# Patient Record
Sex: MALE | Race: ASIAN | NOT HISPANIC OR LATINO
[De-identification: names, ages, dates, MRNs, and addresses within clinical notes are randomized per-mention and may not be internally consistent; named-entity substitution may affect disease eponyms.]

---

## 2020-09-30 ENCOUNTER — APPOINTMENT (OUTPATIENT)
Dept: RADIOLOGY | Facility: CLINIC | Age: 50
End: 2020-09-30

## 2022-03-15 ENCOUNTER — NON-APPOINTMENT (OUTPATIENT)
Age: 52
End: 2022-03-15

## 2022-03-18 ENCOUNTER — APPOINTMENT (OUTPATIENT)
Dept: NEPHROLOGY | Facility: CLINIC | Age: 52
End: 2022-03-18
Payer: COMMERCIAL

## 2022-03-18 VITALS
OXYGEN SATURATION: 98 % | SYSTOLIC BLOOD PRESSURE: 141 MMHG | HEIGHT: 64 IN | WEIGHT: 155 LBS | HEART RATE: 103 BPM | BODY MASS INDEX: 26.46 KG/M2 | DIASTOLIC BLOOD PRESSURE: 88 MMHG

## 2022-03-18 DIAGNOSIS — Z00.00 ENCOUNTER FOR GENERAL ADULT MEDICAL EXAMINATION W/OUT ABNORMAL FINDINGS: ICD-10-CM

## 2022-03-18 DIAGNOSIS — E83.52 HYPERCALCEMIA: ICD-10-CM

## 2022-03-18 PROCEDURE — 99205 OFFICE O/P NEW HI 60 MIN: CPT

## 2022-03-18 RX ORDER — SITAGLIPTIN AND METFORMIN HYDROCHLORIDE 50; 1000 MG/1; MG/1
50-1000 TABLET, FILM COATED ORAL DAILY
Refills: 0 | Status: ACTIVE | COMMUNITY
Start: 2022-03-18

## 2022-03-18 RX ORDER — MELATONIN 3 MG
TABLET ORAL
Refills: 0 | Status: ACTIVE | COMMUNITY

## 2022-03-18 RX ORDER — ASPIRIN 81 MG
81 TABLET, DELAYED RELEASE (ENTERIC COATED) ORAL
Refills: 0 | Status: ACTIVE | COMMUNITY

## 2022-03-18 NOTE — PHYSICAL EXAM
[General Appearance - Alert] : alert [General Appearance - In No Acute Distress] : in no acute distress [General Appearance - Well Nourished] : well nourished [General Appearance - Well-Appearing] : healthy appearing [Sclera] : the sclera and conjunctiva were normal [PERRL With Normal Accommodation] : pupils were equal in size, round, and reactive to light [Extraocular Movements] : extraocular movements were intact [Outer Ear] : the ears and nose were normal in appearance [Hearing Threshold Finger Rub Not Beadle] : hearing was normal [Neck Appearance] : the appearance of the neck was normal [Neck Cervical Mass (___cm)] : no neck mass was observed [Jugular Venous Distention Increased] : there was no jugular-venous distention [Thyroid Diffuse Enlargement] : the thyroid was not enlarged [Respiration, Rhythm And Depth] : normal respiratory rhythm and effort [Exaggerated Use Of Accessory Muscles For Inspiration] : no accessory muscle use [Auscultation Breath Sounds / Voice Sounds] : lungs were clear to auscultation bilaterally [Apical Impulse] : the apical impulse was normal [Heart Rate And Rhythm] : heart rate was normal and rhythm regular [Heart Sounds] : normal S1 and S2 [Murmurs] : no murmurs [Arterial Pulses Carotid] : carotid pulses were normal with no bruits [Edema] : there was no peripheral edema [Bowel Sounds] : normal bowel sounds [Abdomen Soft] : soft [Abdomen Tenderness] : non-tender [No CVA Tenderness] : no ~M costovertebral angle tenderness [Abnormal Walk] : normal gait [Nail Clubbing] : no clubbing  or cyanosis of the fingernails [Involuntary Movements] : no involuntary movements were seen [Musculoskeletal - Swelling] : no joint swelling seen [Skin Color & Pigmentation] : normal skin color and pigmentation [Skin Turgor] : normal skin turgor [] : no rash [Skin Lesions] : no skin lesions [Cranial Nerves] : cranial nerves 2-12 were intact [Motor Exam] : the motor exam was normal [No Focal Deficits] : no focal deficits [Oriented To Time, Place, And Person] : oriented to person, place, and time [Impaired Insight] : insight and judgment were intact [Affect] : the affect was normal [Mood] : the mood was normal [Memory Recent] : recent memory was not impaired

## 2022-03-21 ENCOUNTER — LABORATORY RESULT (OUTPATIENT)
Age: 52
End: 2022-03-21

## 2022-03-22 ENCOUNTER — NON-APPOINTMENT (OUTPATIENT)
Age: 52
End: 2022-03-22

## 2022-03-22 LAB
24R-OH-CALCIDIOL SERPL-MCNC: 50.5 PG/ML
APPEARANCE: CLEAR
BACTERIA: NEGATIVE
BILIRUBIN URINE: NEGATIVE
BLOOD URINE: NEGATIVE
CALCIUM ?TM UR-MCNC: 1.9 MG/DL
CALCIUM SERPL-MCNC: 9.9 MG/DL
COLOR: NORMAL
CREAT SPEC-SCNC: 59 MG/DL
GLUCOSE QUALITATIVE U: NORMAL
HAV IGM SER QL: NONREACTIVE
HBV CORE IGM SER QL: NONREACTIVE
HBV SURFACE AG SER QL: NONREACTIVE
HCV AB SER QL: NONREACTIVE
HCV S/CO RATIO: 0.13 S/CO
HIV1+2 AB SPEC QL IA.RAPID: NONREACTIVE
HYALINE CASTS: 0 /LPF
KETONES URINE: NEGATIVE
LEUKOCYTE ESTERASE URINE: NEGATIVE
MAGNESIUM SERPL-MCNC: 1.8 MG/DL
MICROALBUMIN 24H UR DL<=1MG/L-MCNC: 74.7 MG/DL
MICROALBUMIN/CREAT 24H UR-RTO: 1269 MG/G
MICROSCOPIC-UA: NORMAL
NITRITE URINE: NEGATIVE
PARATHYROID HORMONE INTACT: 47 PG/ML
PH URINE: 6
POTASSIUM UR-SCNC: 25.5 MMOL/L
PROT UR-MCNC: 123 MG/DL
PROTEIN URINE: ABNORMAL
RED BLOOD CELLS URINE: 2 /HPF
SPECIFIC GRAVITY URINE: 1.01
SQUAMOUS EPITHELIAL CELLS: 0 /HPF
UROBILINOGEN URINE: NORMAL
WHITE BLOOD CELLS URINE: 1 /HPF

## 2022-03-29 ENCOUNTER — APPOINTMENT (OUTPATIENT)
Dept: NEPHROLOGY | Facility: CLINIC | Age: 52
End: 2022-03-29
Payer: COMMERCIAL

## 2022-03-29 LAB
25(OH)D3 SERPL-MCNC: 22.1 NG/ML
ALBUMIN MFR SERPL ELPH: 57.7 %
ALBUMIN SERPL ELPH-MCNC: 4.6 G/DL
ALBUMIN SERPL-MCNC: 4.3 G/DL
ALBUMIN/GLOB SERPL: 1.4 RATIO
ALBUPE: 64.7 %
ALPHA1 GLOB MFR SERPL ELPH: 3.6 %
ALPHA1 GLOB SERPL ELPH-MCNC: 0.3 G/DL
ALPHA1UPE: 11.6 %
ALPHA2 GLOB MFR SERPL ELPH: 10.7 %
ALPHA2 GLOB SERPL ELPH-MCNC: 0.8 G/DL
ALPHA2UPE: 7 %
ANA SER IF-ACNC: NEGATIVE
ANION GAP SERPL CALC-SCNC: 16 MMOL/L
B-GLOBULIN MFR SERPL ELPH: 11.7 %
B-GLOBULIN SERPL ELPH-MCNC: 0.9 G/DL
BETAUPE: 7.4 %
BUN SERPL-MCNC: 26 MG/DL
C3 SERPL-MCNC: 111 MG/DL
C4 SERPL-MCNC: 38 MG/DL
CALCIUM SERPL-MCNC: 9.9 MG/DL
CHLORIDE SERPL-SCNC: 101 MMOL/L
CO2 SERPL-SCNC: 20 MMOL/L
CREAT 24H UR-MCNC: NORMAL G/24 H
CREAT SERPL-MCNC: 1.8 MG/DL
CREATININE UR (MAYO): 58 MG/DL
DEPRECATED KAPPA LC FREE/LAMBDA SER: 0.78 RATIO
DSDNA AB SER-ACNC: 12 IU/ML
EGFR: 45 ML/MIN/1.73M2
GAMMA GLOB FLD ELPH-MCNC: 1.2 G/DL
GAMMA GLOB MFR SERPL ELPH: 16.3 %
GAMMAUPE: 9.3 %
GBM AB TITR SER IF: 3
GLUCOSE SERPL-MCNC: 222 MG/DL
IGA 24H UR QL IFE: NORMAL
IGA SER QL IEP: 322 MG/DL
IGG SER QL IEP: 1202 MG/DL
IGM SER QL IEP: 78 MG/DL
INTERPRETATION SERPL IEP-IMP: NORMAL
KAPPA LC 24H UR QL: NORMAL
KAPPA LC CSF-MCNC: 5.5 MG/DL
KAPPA LC SERPL-MCNC: 4.31 MG/DL
M PROTEIN SPEC IFE-MCNC: NORMAL
PHOSPHATE SERPL-MCNC: 3.4 MG/DL
PHOSPHOLIPASE A2 RECEPTOR ELISA: <1.8 RU/ML
POTASSIUM SERPL-SCNC: 5.1 MMOL/L
PROT PATTERN 24H UR ELPH-IMP: NORMAL
PROT SERPL-MCNC: 7.4 G/DL
PROT SERPL-MCNC: 7.4 G/DL
PROT UR-MCNC: 120 MG/DL
PROT UR-MCNC: 120 MG/DL
SODIUM SERPL-SCNC: 137 MMOL/L
SPECIMEN VOL 24H UR: NORMAL ML

## 2022-03-29 PROCEDURE — 99443: CPT

## 2022-03-29 NOTE — HISTORY OF PRESENT ILLNESS
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 Patient is taking\par - Counseled regarding need to control DM\par - Discussed possible KBx. All q's and c's addressed

## 2022-03-29 NOTE — ASSESSMENT
[FreeTextEntry1] : RIVERA FUNG is being seen for CKD and Hypercalcemia\par \par ---------------------------------\par # CKD 3A\par - BASELINE CR: <1.5 - 1.7> based on available labs\par - PROTEINURIA: \par --- RISK FACTORS:\par 1) DM- Pt has DMR\par 2) Ischemic nephropathy 2/2 HTN\par 3) Smoker's GN or other GN\par --- PLAN:\par 1) Renal panel\par 2) U/a, UPC, ACR\par 3) Proteinuria serologies\par 4) MEDS:\par > Will start Farxiga 5mg\par > Hold MRA as K high (5.3)\par \par # HYPERCALCEMIA\par  - 10.5- 10.9\par --- PLAN:\par 1) PTH\par 2) Will go ahead and order Vit D levels and FeCa\par \par # HTN:\par - Current meds:\par 1) Lisinopril 20mg\par --- PLAN:\par 1) Check BP at home and record\par 2) Continue Lisinopril\par 3) Repeat K levels\par \par # HYPERKALEMIA\par - Likely RTA due to DM\par --- PLAN:\par 1) Repeat K\par 2) Mg level\par 3) If still elevated, will start Lokelma\par \par ----------------------------------------------------------\par PATIENT TO GET LABS ON MON 3/21 2:30PM\par FF UP 3 MONTHS

## 2022-03-29 NOTE — ASSESSMENT
[FreeTextEntry1] : RIVERA FUNG is being seen for CKD and Hypercalcemia\par \par ---------------------------------\par # CKD 3A\par - BASELINE CR: <1.5 - 1.7> based on available labs\par - TREND: 1.8 (3/21/22)\par - PROTEINURIA: -- ACR 1.2/ UPC 2.08 (3/21/22)\par - SEROLOGIES: All WNL\par --- RISK FACTORS:\par 1) DM- Pt has DMR\par 2) Ischemic nephropathy 2/2 HTN\par 3) Smoker's GN or other GN\par --- PLAN:\par 1) Needs tighter DM control. Counseled. \par 2) Plan is for better DM control then ff up in 1 month. If Cr and UPC trending up, will consider Kbx. D/w patient\par 3) MEDS:\par > Farxiga 5mg approved. Patient taking\par > Will increase Lisinopril to 40 +/- add MRA next visit if K holds\par \par # HYPERCALCEMIA\par  - 10.5- 10.9- 9.9\par --- PLAN:\par 1) PTH 47, Ca now 9.9\par 2) Vit D 1.25 WNL. \par \par # HTN:\par - Current meds:\par 1) Lisinopril 20mg\par --- PLAN:\par 1) Check BP at home and record\par 2) Will increase Lisinopril to 40 +/- add MRA next visit if K holds\par \par # HYPERKALEMIA\par - Likely RTA due to DM\par --- PLAN:\par 1) K 5.1. on higher side. Likely RTA\par \par ----------------------------------------------------------\par FF UP IN 1 MONTH. LABS BEFOREHAND\par

## 2022-03-29 NOTE — HISTORY OF PRESENT ILLNESS
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

## 2022-03-29 NOTE — REASON FOR VISIT
[Home] : at home, [unfilled] , at the time of the visit. [Medical Office: ([facility], [place])___] : at the medical office located in  [Follow-Up] : a follow-up visit

## 2022-04-21 ENCOUNTER — APPOINTMENT (OUTPATIENT)
Dept: RADIOLOGY | Facility: IMAGING CENTER | Age: 52
End: 2022-04-21
Payer: COMMERCIAL

## 2022-04-21 ENCOUNTER — OUTPATIENT (OUTPATIENT)
Dept: OUTPATIENT SERVICES | Facility: HOSPITAL | Age: 52
LOS: 1 days | End: 2022-04-21
Payer: COMMERCIAL

## 2022-04-21 DIAGNOSIS — Z00.8 ENCOUNTER FOR OTHER GENERAL EXAMINATION: ICD-10-CM

## 2022-04-21 PROCEDURE — 71046 X-RAY EXAM CHEST 2 VIEWS: CPT

## 2022-04-21 PROCEDURE — 72050 X-RAY EXAM NECK SPINE 4/5VWS: CPT | Mod: 26

## 2022-04-21 PROCEDURE — 72050 X-RAY EXAM NECK SPINE 4/5VWS: CPT

## 2022-04-21 PROCEDURE — 71046 X-RAY EXAM CHEST 2 VIEWS: CPT | Mod: 26

## 2022-05-27 ENCOUNTER — APPOINTMENT (OUTPATIENT)
Dept: CARDIOLOGY | Facility: CLINIC | Age: 52
End: 2022-05-27

## 2022-06-03 ENCOUNTER — NON-APPOINTMENT (OUTPATIENT)
Age: 52
End: 2022-06-03

## 2022-06-03 ENCOUNTER — APPOINTMENT (OUTPATIENT)
Dept: NEPHROLOGY | Facility: CLINIC | Age: 52
End: 2022-06-03

## 2022-06-03 VITALS
HEIGHT: 64 IN | OXYGEN SATURATION: 99 % | HEART RATE: 84 BPM | DIASTOLIC BLOOD PRESSURE: 73 MMHG | TEMPERATURE: 97.8 F | WEIGHT: 151 LBS | BODY MASS INDEX: 25.78 KG/M2 | SYSTOLIC BLOOD PRESSURE: 120 MMHG

## 2022-06-03 DIAGNOSIS — E87.5 HYPERKALEMIA: ICD-10-CM

## 2022-06-03 PROCEDURE — 99213 OFFICE O/P EST LOW 20 MIN: CPT

## 2022-06-03 NOTE — PHYSICAL EXAM
[General Appearance - Alert] : alert [General Appearance - In No Acute Distress] : in no acute distress [General Appearance - Well Nourished] : well nourished [General Appearance - Well-Appearing] : healthy appearing [Sclera] : the sclera and conjunctiva were normal [PERRL With Normal Accommodation] : pupils were equal in size, round, and reactive to light [Extraocular Movements] : extraocular movements were intact [Outer Ear] : the ears and nose were normal in appearance [Hearing Threshold Finger Rub Not [place]] : hearing was normal [Neck Appearance] : the appearance of the neck was normal [Neck Cervical Mass (___cm)] : no neck mass was observed [Jugular Venous Distention Increased] : there was no jugular-venous distention [Thyroid Diffuse Enlargement] : the thyroid was not enlarged [Respiration, Rhythm And Depth] : normal respiratory rhythm and effort [Exaggerated Use Of Accessory Muscles For Inspiration] : no accessory muscle use [Auscultation Breath Sounds / Voice Sounds] : lungs were clear to auscultation bilaterally [Apical Impulse] : the apical impulse was normal [Heart Rate And Rhythm] : heart rate was normal and rhythm regular [Heart Sounds] : normal S1 and S2 [Murmurs] : no murmurs [Arterial Pulses Carotid] : carotid pulses were normal with no bruits [Edema] : there was no peripheral edema [Bowel Sounds] : normal bowel sounds [Abdomen Soft] : soft [Abdomen Tenderness] : non-tender [No CVA Tenderness] : no ~M costovertebral angle tenderness [Abnormal Walk] : normal gait [Nail Clubbing] : no clubbing  or cyanosis of the fingernails [Involuntary Movements] : no involuntary movements were seen [Musculoskeletal - Swelling] : no joint swelling seen [Skin Color & Pigmentation] : normal skin color and pigmentation [Skin Turgor] : normal skin turgor [] : no rash [Skin Lesions] : no skin lesions [Cranial Nerves] : cranial nerves 2-12 were intact [Motor Exam] : the motor exam was normal [No Focal Deficits] : no focal deficits [Oriented To Time, Place, And Person] : oriented to person, place, and time [Impaired Insight] : insight and judgment were intact [Affect] : the affect was normal [Mood] : the mood was normal [Memory Recent] : recent memory was not impaired

## 2022-06-03 NOTE — HISTORY OF PRESENT ILLNESS
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 Patient is taking\par - Counseled regarding need to control DM\par - Discussed possible KBx. All q's and c's addressed\par \par --------------------------------------------------\par 6/3/22\par - HOSPITALIZATION: Pt hospitalized May 2020 for diarrhea x 5 days. Found to have symptomatic anemia + OMAR(?). Had also been drinking Gatorade to hydrate himself so his K was reportedly 7.0+. Discharge Cr was reportedly 1.6 but it was higher during his admission. Admitted to [facility].\par - ANEMIA: Was told bleeding was "external" to GI tract. For colonoscopy next month. Also following-up with Hematology. Had IV iron transfusion, finished yesterday. \par - TOBACCO USE: Quit 2 months ago\par - DM: Met with nutritionist last week from [facility] so she can better relate to his diet\par - MED REVIEW: Stopped Lisinopril (OMAR and hyperK). Taking Farxiga. \par

## 2022-06-03 NOTE — ASSESSMENT
[FreeTextEntry1] : RIVERA FUNG is being seen for CKD and Hypercalcemia\par \par ---------------------------------\par # CKD 3A\par - BASELINE CR: <1.5 - 1.7> based on available labs\par - TREND: 1.8 (3/21/22)-- Hospitalized May 2020 for diarrhea and OMAR -- d/c Cr 1.6\par - PROTEINURIA: -- ACR 1.2/ UPC 2.08 (3/21/22)\par - SEROLOGIES: All WNL\par --- RISK FACTORS:\par 1) DM- Pt has DMR\par 2) Ischemic nephropathy 2/2 HTN\par 3) Smoker's GN or other GN\par --- PLAN:\par 1) OMAR in hospital, but he could not remember what he was told the etiology was for anemia. D/c Cr reportedly 1.6. So it sounds hemodynamically-driven due to anemia if he came in with OMAR which resolved with transfusions\par 2) Repeat renal panel today. \par 3) PROTEINURIA: Repeat UPC to trend\par > Lisinopril held 2/2 OMAR and hyperK. Recheck K today\par > Taking Farxiga\par > MRA: Hold off until K stable\par 4) Rest of meds reviewed\par \par # HYPERCALCEMIA\par  - 10.5- 10.9- 9.9\par --- PLAN:\par 1) PTH 47, Ca 9.9 last labs\par 2) Repeat Ca today\par \par # HTN:\par - Current meds:\par 1) Lisinopril 20mg- HELD\par --- PLAN:\par 1) BP acceptable w/o meds\par 2) Will restart ACE/ARB low dose if K holds\par \par # HYPERKALEMIA\par - Likely RTA due to DM\par --- PLAN:\par 1) Recheck K\par \par ----------------------------------------------------------\par FF UP IN 3 MONTHS. LABS BEFOREHAND

## 2022-06-10 ENCOUNTER — NON-APPOINTMENT (OUTPATIENT)
Age: 52
End: 2022-06-10

## 2022-06-10 LAB
ALBUMIN SERPL ELPH-MCNC: 4.7 G/DL
ANION GAP SERPL CALC-SCNC: 13 MMOL/L
BASOPHILS # BLD AUTO: 0.07 K/UL
BASOPHILS NFR BLD AUTO: 0.8 %
BUN SERPL-MCNC: 37 MG/DL
CALCIUM SERPL-MCNC: 9.9 MG/DL
CHLORIDE SERPL-SCNC: 100 MMOL/L
CO2 SERPL-SCNC: 21 MMOL/L
CREAT SERPL-MCNC: 1.96 MG/DL
EGFR: 41 ML/MIN/1.73M2
EOSINOPHIL # BLD AUTO: 0.75 K/UL
EOSINOPHIL NFR BLD AUTO: 8.7 %
GLUCOSE SERPL-MCNC: 171 MG/DL
HCT VFR BLD CALC: 34.1 %
HGB BLD-MCNC: 10.6 G/DL
IMM GRANULOCYTES NFR BLD AUTO: 0.3 %
LYMPHOCYTES # BLD AUTO: 2.49 K/UL
LYMPHOCYTES NFR BLD AUTO: 28.8 %
MAN DIFF?: NORMAL
MCHC RBC-ENTMCNC: 29 PG
MCHC RBC-ENTMCNC: 31.1 GM/DL
MCV RBC AUTO: 93.2 FL
MONOCYTES # BLD AUTO: 0.74 K/UL
MONOCYTES NFR BLD AUTO: 8.6 %
NEUTROPHILS # BLD AUTO: 4.56 K/UL
NEUTROPHILS NFR BLD AUTO: 52.8 %
PHOSPHATE SERPL-MCNC: 3.1 MG/DL
PLATELET # BLD AUTO: 405 K/UL
POTASSIUM SERPL-SCNC: 5.8 MMOL/L
RBC # BLD: 3.66 M/UL
RBC # FLD: 12 %
SODIUM SERPL-SCNC: 134 MMOL/L
WBC # FLD AUTO: 8.64 K/UL

## 2022-06-10 RX ORDER — SODIUM ZIRCONIUM CYCLOSILICATE 10 G/10G
10 POWDER, FOR SUSPENSION ORAL
Qty: 30 | Refills: 0 | Status: ACTIVE | COMMUNITY
Start: 2022-06-10 | End: 1900-01-01

## 2022-06-23 PROBLEM — E87.5 HYPERKALEMIA: Status: ACTIVE | Noted: 2022-06-23

## 2022-06-23 RX ORDER — SODIUM POLYSTYRENE SULFONATE 4.1 MEQ/G
POWDER, FOR SUSPENSION ORAL; RECTAL
Qty: 900 | Refills: 3 | Status: ACTIVE | COMMUNITY
Start: 2022-06-23 | End: 1900-01-01

## 2022-07-01 ENCOUNTER — APPOINTMENT (OUTPATIENT)
Dept: ENDOCRINOLOGY | Facility: CLINIC | Age: 52
End: 2022-07-01

## 2022-07-01 NOTE — HISTORY OF PRESENT ILLNESS
[FreeTextEntry1] : Mr. RIVERA FUNG is a 51 year old male who presents for initial endocrine evaluation with regard to a hx of type 2 dm. The diabetes has been present for about    years. \par \par He denies any history of retiopathy but does have CKD with Proteinuria and follows with Dr. Goins. With regard to neuropathy,he    \par \par  For the diabetes, he   is currently taking  Janumet  mg bid plus Farxiga 5 mg daily and Glipizide 5 mg daily  He denies polyuria, polydipsia, or any visual changes. He  too denies any skin lesions, kin breakdown or non-healing areas of skin. He  too denies any podiatric concerns. Ophthalmologic evaluation is up to date. Home glucose monitoring has shown values to be running.  He  does deny any hypoglycemia or hypoglycemic signs or symptoms.\par \par \par Additional medical history includes that of CKD, hyperkalemia, Hyperlipidemia and vitamin d deficiency\par \par Was hospitalized at [facility] in May of this year with symptomatic anemia, diarrhea, OMAR, and Hyperkalemia.\par Had IV iron infusion.\par \par Due for colonoscopy.\par \par Patients wife and daughter are nurses.\par \par Does smoke less than one ppd cigs and had2 beers at night daily\par SErum creatinine has been in the 1.8-1.9 range with GFR around 41.\par

## 2022-07-18 ENCOUNTER — RX RENEWAL (OUTPATIENT)
Age: 52
End: 2022-07-18

## 2022-07-18 RX ORDER — DAPAGLIFLOZIN 5 MG/1
5 TABLET, FILM COATED ORAL DAILY
Qty: 30 | Refills: 3 | Status: ACTIVE | COMMUNITY
Start: 2022-03-18 | End: 1900-01-01

## 2022-09-28 ENCOUNTER — NON-APPOINTMENT (OUTPATIENT)
Age: 52
End: 2022-09-28

## 2022-09-28 ENCOUNTER — APPOINTMENT (OUTPATIENT)
Dept: CARDIOLOGY | Facility: CLINIC | Age: 52
End: 2022-09-28
Payer: COMMERCIAL

## 2022-09-28 VITALS
OXYGEN SATURATION: 99 % | HEART RATE: 98 BPM | DIASTOLIC BLOOD PRESSURE: 72 MMHG | HEIGHT: 64 IN | TEMPERATURE: 98.5 F | BODY MASS INDEX: 25.1 KG/M2 | SYSTOLIC BLOOD PRESSURE: 128 MMHG | WEIGHT: 147 LBS

## 2022-09-28 DIAGNOSIS — F17.200 NICOTINE DEPENDENCE, UNSPECIFIED, UNCOMPLICATED: ICD-10-CM

## 2022-09-28 DIAGNOSIS — E78.5 HYPERLIPIDEMIA, UNSPECIFIED: ICD-10-CM

## 2022-09-28 DIAGNOSIS — I10 ESSENTIAL (PRIMARY) HYPERTENSION: ICD-10-CM

## 2022-09-28 DIAGNOSIS — R94.31 ABNORMAL ELECTROCARDIOGRAM [ECG] [EKG]: ICD-10-CM

## 2022-09-28 DIAGNOSIS — Z13.228 ENCOUNTER FOR SCREENING FOR OTHER METABOLIC DISORDERS: ICD-10-CM

## 2022-09-28 DIAGNOSIS — E11.9 TYPE 2 DIABETES MELLITUS W/OUT COMPLICATIONS: ICD-10-CM

## 2022-09-28 PROCEDURE — 99204 OFFICE O/P NEW MOD 45 MIN: CPT | Mod: 25

## 2022-09-28 PROCEDURE — 93000 ELECTROCARDIOGRAM COMPLETE: CPT

## 2022-09-28 NOTE — HISTORY OF PRESENT ILLNESS
[FreeTextEntry1] : This is a 52 year old current smoker ( 15 p/y/hx) male with a Pmhx of HTN HLD DM CKD who presents to the office as a new patient to establish cardiac care\par pt reports overall feeling well Denies any complaints \par Pt denies any CP,  heart palpitations, dizziness, abdominal pain N/V/D fever or chills\par \par Denies any known CAD, heart arrhythmias, or valvular disease pt with ocassional dyspnea

## 2022-10-03 ENCOUNTER — APPOINTMENT (OUTPATIENT)
Dept: CARDIOLOGY | Facility: CLINIC | Age: 52
End: 2022-10-03

## 2022-10-04 ENCOUNTER — APPOINTMENT (OUTPATIENT)
Dept: CARDIOLOGY | Facility: CLINIC | Age: 52
End: 2022-10-04

## 2022-10-05 LAB
ALBUMIN SERPL ELPH-MCNC: 4.6 G/DL
ALP BLD-CCNC: 75 U/L
ALT SERPL-CCNC: 13 U/L
ANION GAP SERPL CALC-SCNC: 12 MMOL/L
APPEARANCE: CLEAR
AST SERPL-CCNC: 12 U/L
BACTERIA: NEGATIVE
BILIRUB DIRECT SERPL-MCNC: 0.1 MG/DL
BILIRUB INDIRECT SERPL-MCNC: 0.2 MG/DL
BILIRUB SERPL-MCNC: 0.3 MG/DL
BILIRUBIN URINE: NEGATIVE
BLOOD URINE: NEGATIVE
BUN SERPL-MCNC: 25 MG/DL
CALCIUM SERPL-MCNC: 10.5 MG/DL
CHLORIDE SERPL-SCNC: 102 MMOL/L
CHOLEST SERPL-MCNC: 123 MG/DL
CK SERPL-CCNC: 114 U/L
CO2 SERPL-SCNC: 24 MMOL/L
COLOR: NORMAL
CREAT SERPL-MCNC: 1.53 MG/DL
EGFR: 54 ML/MIN/1.73M2
ESTIMATED AVERAGE GLUCOSE: 137 MG/DL
GLUCOSE QUALITATIVE U: ABNORMAL
GLUCOSE SERPL-MCNC: 137 MG/DL
HBA1C MFR BLD HPLC: 6.4 %
HDLC SERPL-MCNC: 28 MG/DL
HYALINE CASTS: 0 /LPF
KETONES URINE: NEGATIVE
LDLC SERPL CALC-MCNC: 32 MG/DL
LEUKOCYTE ESTERASE URINE: NEGATIVE
MAGNESIUM SERPL-MCNC: 2 MG/DL
MICROSCOPIC-UA: NORMAL
NITRITE URINE: NEGATIVE
NONHDLC SERPL-MCNC: 95 MG/DL
PH URINE: 6.5
POTASSIUM SERPL-SCNC: 4.8 MMOL/L
PROT SERPL-MCNC: 7.7 G/DL
PROTEIN URINE: ABNORMAL
PSA SERPL-MCNC: 0.39 NG/ML
RED BLOOD CELLS URINE: 1 /HPF
SODIUM SERPL-SCNC: 138 MMOL/L
SPECIFIC GRAVITY URINE: 1.02
SQUAMOUS EPITHELIAL CELLS: 0 /HPF
T4 FREE SERPL-MCNC: 1.2 NG/DL
TRIGL SERPL-MCNC: 312 MG/DL
TSH SERPL-ACNC: 0.55 UIU/ML
UROBILINOGEN URINE: NORMAL
WHITE BLOOD CELLS URINE: 1 /HPF

## 2023-01-03 ENCOUNTER — APPOINTMENT (OUTPATIENT)
Dept: CARDIOLOGY | Facility: CLINIC | Age: 53
End: 2023-01-03

## 2023-03-03 ENCOUNTER — NON-APPOINTMENT (OUTPATIENT)
Age: 53
End: 2023-03-03

## 2023-03-03 DIAGNOSIS — N18.31 CHRONIC KIDNEY DISEASE, STAGE 3A: ICD-10-CM

## 2023-03-21 ENCOUNTER — APPOINTMENT (OUTPATIENT)
Dept: NEPHROLOGY | Facility: CLINIC | Age: 53
End: 2023-03-21

## 2023-03-30 ENCOUNTER — APPOINTMENT (OUTPATIENT)
Dept: CARDIOLOGY | Facility: CLINIC | Age: 53
End: 2023-03-30

## 2023-05-23 ENCOUNTER — APPOINTMENT (OUTPATIENT)
Dept: NEPHROLOGY | Facility: CLINIC | Age: 53
End: 2023-05-23

## 2023-06-01 ENCOUNTER — APPOINTMENT (OUTPATIENT)
Dept: CARDIOLOGY | Facility: CLINIC | Age: 53
End: 2023-06-01

## 2023-06-13 ENCOUNTER — APPOINTMENT (OUTPATIENT)
Dept: CARDIOLOGY | Facility: CLINIC | Age: 53
End: 2023-06-13

## 2023-06-13 ENCOUNTER — NON-APPOINTMENT (OUTPATIENT)
Age: 53
End: 2023-06-13

## 2023-07-13 ENCOUNTER — APPOINTMENT (OUTPATIENT)
Dept: CARDIOLOGY | Facility: CLINIC | Age: 53
End: 2023-07-13